# Patient Record
Sex: FEMALE | Race: WHITE | HISPANIC OR LATINO
[De-identification: names, ages, dates, MRNs, and addresses within clinical notes are randomized per-mention and may not be internally consistent; named-entity substitution may affect disease eponyms.]

---

## 2020-06-22 ENCOUNTER — TRANSCRIPTION ENCOUNTER (OUTPATIENT)
Age: 55
End: 2020-06-22

## 2024-02-28 ENCOUNTER — INPATIENT (INPATIENT)
Facility: HOSPITAL | Age: 59
LOS: 0 days | Discharge: HOME | DRG: 512 | End: 2024-02-29
Attending: ORTHOPAEDIC SURGERY | Admitting: ORTHOPAEDIC SURGERY
Payer: MEDICAID

## 2024-02-28 ENCOUNTER — TRANSCRIPTION ENCOUNTER (OUTPATIENT)
Age: 59
End: 2024-02-28

## 2024-02-28 VITALS
WEIGHT: 179.9 LBS | SYSTOLIC BLOOD PRESSURE: 131 MMHG | OXYGEN SATURATION: 98 % | RESPIRATION RATE: 20 BRPM | HEART RATE: 76 BPM | TEMPERATURE: 99 F | HEIGHT: 61 IN | DIASTOLIC BLOOD PRESSURE: 81 MMHG

## 2024-02-28 DIAGNOSIS — S52.90XA UNSPECIFIED FRACTURE OF UNSPECIFIED FOREARM, INITIAL ENCOUNTER FOR CLOSED FRACTURE: ICD-10-CM

## 2024-02-28 LAB
ALBUMIN SERPL ELPH-MCNC: 4.4 G/DL — SIGNIFICANT CHANGE UP (ref 3.3–5)
ALBUMIN SERPL ELPH-MCNC: 4.4 G/DL — SIGNIFICANT CHANGE UP (ref 3.3–5)
ALP SERPL-CCNC: 110 U/L — SIGNIFICANT CHANGE UP (ref 40–120)
ALT FLD-CCNC: 26 U/L — SIGNIFICANT CHANGE UP (ref 10–45)
ANION GAP SERPL CALC-SCNC: 12 MMOL/L — SIGNIFICANT CHANGE UP (ref 5–17)
ANION GAP SERPL CALC-SCNC: 12 MMOL/L — SIGNIFICANT CHANGE UP (ref 5–17)
APTT BLD: 34 SEC — SIGNIFICANT CHANGE UP (ref 24.5–35.6)
AST SERPL-CCNC: 32 U/L — SIGNIFICANT CHANGE UP (ref 10–40)
BASOPHILS # BLD AUTO: 0.1 K/UL — SIGNIFICANT CHANGE UP (ref 0–0.2)
BASOPHILS NFR BLD AUTO: 0.9 % — SIGNIFICANT CHANGE UP (ref 0–2)
BILIRUB SERPL-MCNC: 0.8 MG/DL — SIGNIFICANT CHANGE UP (ref 0.2–1.2)
BLD GP AB SCN SERPL QL: NEGATIVE — SIGNIFICANT CHANGE UP
BUN SERPL-MCNC: 10 MG/DL — SIGNIFICANT CHANGE UP (ref 7–23)
BUN SERPL-MCNC: 10 MG/DL — SIGNIFICANT CHANGE UP (ref 7–23)
CALCIUM SERPL-MCNC: 8.7 MG/DL — SIGNIFICANT CHANGE UP (ref 8.4–10.5)
CALCIUM SERPL-MCNC: 8.7 MG/DL — SIGNIFICANT CHANGE UP (ref 8.4–10.5)
CHLORIDE SERPL-SCNC: 105 MMOL/L — SIGNIFICANT CHANGE UP (ref 96–108)
CHLORIDE SERPL-SCNC: 105 MMOL/L — SIGNIFICANT CHANGE UP (ref 96–108)
CO2 SERPL-SCNC: 23 MMOL/L — SIGNIFICANT CHANGE UP (ref 22–31)
CO2 SERPL-SCNC: 23 MMOL/L — SIGNIFICANT CHANGE UP (ref 22–31)
CREAT SERPL-MCNC: 0.41 MG/DL — LOW (ref 0.5–1.3)
CREAT SERPL-MCNC: 0.41 MG/DL — LOW (ref 0.5–1.3)
EGFR: 113 ML/MIN/1.73M2 — SIGNIFICANT CHANGE UP
EGFR: 113 ML/MIN/1.73M2 — SIGNIFICANT CHANGE UP
EOSINOPHIL # BLD AUTO: 0.1 K/UL — SIGNIFICANT CHANGE UP (ref 0–0.5)
EOSINOPHIL NFR BLD AUTO: 0.9 % — SIGNIFICANT CHANGE UP (ref 0–6)
GLUCOSE SERPL-MCNC: 124 MG/DL — HIGH (ref 70–99)
GLUCOSE SERPL-MCNC: 124 MG/DL — HIGH (ref 70–99)
HCT VFR BLD CALC: 39.2 % — SIGNIFICANT CHANGE UP (ref 34.5–45)
HGB BLD-MCNC: 13.3 G/DL — SIGNIFICANT CHANGE UP (ref 11.5–15.5)
INR BLD: 1.09 RATIO — SIGNIFICANT CHANGE UP (ref 0.85–1.18)
LYMPHOCYTES # BLD AUTO: 1 K/UL — SIGNIFICANT CHANGE UP (ref 1–3.3)
LYMPHOCYTES # BLD AUTO: 9.5 % — LOW (ref 13–44)
MCHC RBC-ENTMCNC: 25.1 PG — LOW (ref 27–34)
MCHC RBC-ENTMCNC: 33.9 GM/DL — SIGNIFICANT CHANGE UP (ref 32–36)
MCV RBC AUTO: 74 FL — LOW (ref 80–100)
MONOCYTES # BLD AUTO: 0.37 K/UL — SIGNIFICANT CHANGE UP (ref 0–0.9)
MONOCYTES NFR BLD AUTO: 3.5 % — SIGNIFICANT CHANGE UP (ref 2–14)
NEUTROPHILS # BLD AUTO: 8.63 K/UL — HIGH (ref 1.8–7.4)
NEUTROPHILS NFR BLD AUTO: 79.1 % — HIGH (ref 43–77)
PLATELET # BLD AUTO: 221 K/UL — SIGNIFICANT CHANGE UP (ref 150–400)
POTASSIUM SERPL-MCNC: 3.9 MMOL/L — SIGNIFICANT CHANGE UP (ref 3.5–5.3)
POTASSIUM SERPL-MCNC: 3.9 MMOL/L — SIGNIFICANT CHANGE UP (ref 3.5–5.3)
POTASSIUM SERPL-SCNC: 3.9 MMOL/L — SIGNIFICANT CHANGE UP (ref 3.5–5.3)
POTASSIUM SERPL-SCNC: 3.9 MMOL/L — SIGNIFICANT CHANGE UP (ref 3.5–5.3)
PROT SERPL-MCNC: 7.3 G/DL — SIGNIFICANT CHANGE UP (ref 6–8.3)
PROTHROM AB SERPL-ACNC: 11.4 SEC — SIGNIFICANT CHANGE UP (ref 9.5–13)
RBC # BLD: 5.3 M/UL — HIGH (ref 3.8–5.2)
RBC # FLD: 14.5 % — SIGNIFICANT CHANGE UP (ref 10.3–14.5)
RH IG SCN BLD-IMP: POSITIVE — SIGNIFICANT CHANGE UP
SODIUM SERPL-SCNC: 140 MMOL/L — SIGNIFICANT CHANGE UP (ref 135–145)
SODIUM SERPL-SCNC: 140 MMOL/L — SIGNIFICANT CHANGE UP (ref 135–145)
WBC # BLD: 10.56 K/UL — HIGH (ref 3.8–10.5)
WBC # FLD AUTO: 10.56 K/UL — HIGH (ref 3.8–10.5)

## 2024-02-28 PROCEDURE — 73090 X-RAY EXAM OF FOREARM: CPT | Mod: 26,LT,77

## 2024-02-28 PROCEDURE — 99285 EMERGENCY DEPT VISIT HI MDM: CPT

## 2024-02-28 PROCEDURE — 73030 X-RAY EXAM OF SHOULDER: CPT | Mod: 26,RT

## 2024-02-28 PROCEDURE — 73090 X-RAY EXAM OF FOREARM: CPT | Mod: 26,LT

## 2024-02-28 PROCEDURE — 73130 X-RAY EXAM OF HAND: CPT | Mod: 26,RT

## 2024-02-28 PROCEDURE — 73110 X-RAY EXAM OF WRIST: CPT | Mod: 26,RT

## 2024-02-28 PROCEDURE — 73110 X-RAY EXAM OF WRIST: CPT | Mod: 26,RT,77

## 2024-02-28 PROCEDURE — 76377 3D RENDER W/INTRP POSTPROCES: CPT | Mod: 26

## 2024-02-28 PROCEDURE — 73080 X-RAY EXAM OF ELBOW: CPT | Mod: 26,RT

## 2024-02-28 PROCEDURE — 71045 X-RAY EXAM CHEST 1 VIEW: CPT | Mod: 26

## 2024-02-28 PROCEDURE — 73200 CT UPPER EXTREMITY W/O DYE: CPT | Mod: 26,RT,MC

## 2024-02-28 RX ORDER — OXYCODONE HYDROCHLORIDE 5 MG/1
5 TABLET ORAL ONCE
Refills: 0 | Status: DISCONTINUED | OUTPATIENT
Start: 2024-02-28 | End: 2024-02-28

## 2024-02-28 RX ORDER — OXYCODONE HYDROCHLORIDE 5 MG/1
2.5 TABLET ORAL EVERY 4 HOURS
Refills: 0 | Status: DISCONTINUED | OUTPATIENT
Start: 2024-02-28 | End: 2024-02-29

## 2024-02-28 RX ORDER — HEPARIN SODIUM 5000 [USP'U]/ML
5000 INJECTION INTRAVENOUS; SUBCUTANEOUS EVERY 12 HOURS
Refills: 0 | Status: DISCONTINUED | OUTPATIENT
Start: 2024-02-28 | End: 2024-02-28

## 2024-02-28 RX ORDER — SODIUM CHLORIDE 9 MG/ML
1000 INJECTION INTRAMUSCULAR; INTRAVENOUS; SUBCUTANEOUS
Refills: 0 | Status: DISCONTINUED | OUTPATIENT
Start: 2024-02-28 | End: 2024-02-29

## 2024-02-28 RX ORDER — ACETAMINOPHEN 500 MG
975 TABLET ORAL ONCE
Refills: 0 | Status: COMPLETED | OUTPATIENT
Start: 2024-02-28 | End: 2024-02-28

## 2024-02-28 RX ORDER — IBUPROFEN 200 MG
600 TABLET ORAL ONCE
Refills: 0 | Status: COMPLETED | OUTPATIENT
Start: 2024-02-28 | End: 2024-02-28

## 2024-02-28 RX ORDER — HEPARIN SODIUM 5000 [USP'U]/ML
5000 INJECTION INTRAVENOUS; SUBCUTANEOUS EVERY 12 HOURS
Refills: 0 | Status: COMPLETED | OUTPATIENT
Start: 2024-02-28 | End: 2024-02-28

## 2024-02-28 RX ORDER — OXYCODONE HYDROCHLORIDE 5 MG/1
5 TABLET ORAL EVERY 4 HOURS
Refills: 0 | Status: DISCONTINUED | OUTPATIENT
Start: 2024-02-28 | End: 2024-02-29

## 2024-02-28 RX ORDER — ACETAMINOPHEN 500 MG
975 TABLET ORAL EVERY 8 HOURS
Refills: 0 | Status: DISCONTINUED | OUTPATIENT
Start: 2024-02-28 | End: 2024-02-29

## 2024-02-28 RX ORDER — METOCLOPRAMIDE HCL 10 MG
5 TABLET ORAL EVERY 6 HOURS
Refills: 0 | Status: DISCONTINUED | OUTPATIENT
Start: 2024-02-28 | End: 2024-02-29

## 2024-02-28 RX ADMIN — Medication 975 MILLIGRAM(S): at 21:05

## 2024-02-28 RX ADMIN — Medication 975 MILLIGRAM(S): at 23:36

## 2024-02-28 RX ADMIN — Medication 600 MILLIGRAM(S): at 14:08

## 2024-02-28 RX ADMIN — Medication 975 MILLIGRAM(S): at 14:08

## 2024-02-28 RX ADMIN — OXYCODONE HYDROCHLORIDE 5 MILLIGRAM(S): 5 TABLET ORAL at 15:13

## 2024-02-28 RX ADMIN — HEPARIN SODIUM 5000 UNIT(S): 5000 INJECTION INTRAVENOUS; SUBCUTANEOUS at 20:53

## 2024-02-28 NOTE — ED ADULT NURSE REASSESSMENT NOTE - NS ED NURSE REASSESS COMMENT FT1
No complaints from patient at this time. Pt has brisk cap refill +2 in splinted right extremity, denies numbness / tingling. No complaints at this time

## 2024-02-28 NOTE — ED PROVIDER NOTE - OBJECTIVE STATEMENT
59yo F with PMH of preDM, HTN, R hand dominant presenting with R hand and wrist pain s/p FOOSH 2 hours ago. Pt is both English and Cook Islander speaking, prefers son-in-law at bedside to assist with [place]er interpretation when needed. Pt reports she tripped and fell onto RUE earlier today, no head injury, no LOC. Reports pain and swelling to L wrist and hand. Reports mild numbness to R index finger. Denies any R elbow/upper arm/shoulder pain, any other numbness/tingling, neck pain, back pain. Has not taken anything for pain. 59yo F with PMH of preDM, HTN, R hand dominant, presenting with R hand and wrist pain s/p FOOSH 2 hours ago. Pt is both English and Bengali speaking, prefers son-in-law at bedside to assist with Bengali interpretation when needed. Pt reports she tripped and fell onto RUE earlier today, no head injury, no LOC. Reports pain and swelling to L wrist and hand. Reports mild numbness to R index finger. Denies any R elbow/upper arm/shoulder pain, any other numbness/tingling, neck pain, back pain. Has not taken anything for pain. 57yo F with PMH of preDM, HTN, R hand dominant, presenting with R hand and wrist pain s/p FOOSH 2 hours ago. Pt is both English and Tajik speaking, prefers son-in-law at bedside to assist with Tajik interpretation when needed. Pt reports she tripped and fell onto RUE earlier today, no head injury, no LOC. Reports pain and swelling to R wrist and hand. Reports mild numbness to R index finger. Denies any R elbow/upper arm/shoulder pain, any other numbness/tingling, neck pain, back pain. Has not taken anything for pain.

## 2024-02-28 NOTE — ED PROVIDER NOTE - NSFOLLOWUPINSTRUCTIONS_ED_ALL_ED_FT
Keep splint in place as instructed.   Remain non-weight bearing on your right arm.   Keep your Right arm elevated when possible to reduce swelling.   Apply ice 20mins on, 40mins off in cycle for first 24-48 hours.    Take Ibuprofen (i.e. Motrin, Advil) 600mg every 8 hrs for pain as needed. Take with food.   May alternate with Acetaminophen (Tylenol) 650mg every 6 hours for pain as needed.     Take oxycodone 5mg every 8 hours as needed for severe pain *** caution SIDE EFFECT of drowsiness - DO NOT drive or drink alcohol while taking this medication.     Follow up with Orthopedic,  ____ in office this week. Information provided.    Return to ED if symptoms worsen, for worsening pain, fever, weakness, numbness, or any other concerning symptoms.

## 2024-02-28 NOTE — ED PROVIDER NOTE - CLINICAL SUMMARY MEDICAL DECISION MAKING FREE TEXT BOX
57 yo F PMHx of pre-DM, HTN, presenting following FOOSH injury w/ c/o R wrist pain/injury. Son at bedside for translation. Injury isolated to wrist, no head strike or LOC. Mild numbness 3rd digit distally, otherwise no numbness distal to injury. Exam w/ deformity and sig swelling of R wrist, unable to rom, sensation intact distally, cap refill and radial pulse intact. Able to wiggle all fingers of hand. Exam otherwise atraumatic. Head atraumatic. CTAB. Heart RRR. Plan for XRs, symptomatic tx. High likelihood of fx based on exam. Initial exam not consistent w/ compartment syndrome. Will reassess after imaging.

## 2024-02-28 NOTE — ED PROVIDER NOTE - UPPER EXTREMITY EXAM, RIGHT
+ diffuse swelling and ttp to R wrist, radial pulse 2+, sensation intact to RUE, cap refill <2s; no ttp to prox forearm/elbow/humerus/shoulder

## 2024-02-28 NOTE — H&P ADULT - HISTORY OF PRESENT ILLNESS
58y Female RHD presents to ED after mechanical fall with severe right wrist pain. Patient denies headstrike or LOC. Patient noticed immediate pain and inability to range the wrist. Patient subsequently came to the ED for further evaluation and management. In the ED, endorses pain and swelling over the wrist and pain with range of motion. Patient denies any fevers, chills, numbness, tingling, weakness, or any other orthopaedic complaint.     Physical Exam  Vital Signs Last 24 Hrs  T(C): 37 (02-28-24 @ 17:20), Max: 37.1 (02-28-24 @ 13:00)  T(F): 98.6 (02-28-24 @ 17:20), Max: 98.8 (02-28-24 @ 13:00)  HR: 58 (02-28-24 @ 17:20) (58 - 76)  BP: 128/61 (02-28-24 @ 17:20) (128/61 - 131/81)  BP(mean): --  RR: 17 (02-28-24 @ 17:20) (17 - 20)  SpO2: 99% (02-28-24 @ 17:20) (98% - 99%)    Gen: Resting in bed, NAD  RUE:   Skin intact. Notable edema and deformity over the wrist/distal forearm. No lesions appreciated.   TTP over deformity, decreased and painful ROM of the wrist; otherwise, NTTP throughout the rest of the extremity.  SILT C5-T1.    Ax/rad/msc/med/uln/ain/pin intact.   Radial pulse palpable.   No calf tenderness bilaterally.   Compartments soft and compressible.     Secondary Assessment:  NC/AT, NTTP of clavicles, NTTP of C-spine,T-spine, or L-spine in the midline and paraspinal areas; NTTP of pelvis  LUE: NTTP of Shoulder, Elbow, Wrist, Hand; NT with AROM/PROM of Shoulder, Elbow, Wrist, Hand; AIN/PIN/Med/Uln/Msc/Rad/Ax intact  LLE: Able to SLR, NT with Log Roll, NT with Heel Strike, NTTP of Hip, Knee, Ankle, Foot; NT with AROM/PROM of Hip, Knee, Ankle, Foot; Q/H/GSC/TA/EHL/FHL intact  RLE: Able to SLR, NT with Log Roll, NT with Heel Strike, NTTP of Hip, Knee, Ankle, Foot; NT with AROM/PROM of Hip, Knee, Ankle, Foot; Q/H/GSC/TA/EHL/FHL intact      Imaging: Xray, 3 views of R wrist reviewed demonstrating distal radius fracture    Note:  Risks and benefits for the procedure were explained to the patient. An injection was offered to the patient for analgesia prior to procedure. The patient expressed understanding and agreed with the plan. The patient gave verbal consent for the injection and procedure. The skin was prepped in sterile fashion with alcohol scrub. The fracture site was then injected with 10mL 1% lidocaine without epinephrine. Time was allowed for anesthetic effect to occur. Once the patient was comfortable, the extremity was generally cleaned. The fracture was then manipulated/closed reduced. The extremity was wrapped with Webril, with care to pad the bony prominences over the olecranon and medial and lateral epicondyles. A plaster splint was applied; wrapped with Webril and an ace wrap; and molded until hardened. Care was taken to avoid sharp edges and to protect the skin. The extremity was elevated on pillows and ice was applied. Neurovascular exam was unchanged after the procedure. Post reduction films were ordered and demonstrated adequate reduction of the fracture.    Assessment and Plan  58y Female with right distal radius fracture    Imaging findings reviewed and discussed with the patient.   Fracture reduced and splinted per above procedure note.   JOY PATTEN in sugar tong splint  Pain control PRN  Dispo: Admit to Dr. Gong for OR tomorrow  All of the patient's questions and concerns were answered and addressed.  Will discuss with Dr. Gong and advise of any changes to the plan.

## 2024-02-28 NOTE — ED ADULT NURSE NOTE - OBJECTIVE STATEMENT
59 y/o Female presents to ED from home with c/o right wrist pain. Son in law at bedside. PMH of DM, HTN. Pt states she had single mechanical trip and fall onto right wrist. Endorses right wrist pain. Denies SOB, CP, HA, N/V/D, numbness/tingling. Has not taken anything for pain. Pt is A&Ox3, speaking full sentences without difficulty. Airway patent with spontaneous unlabored breathing,  skin is warm and normal color for race. Equal sensation, pt is able to move wrist and fingers. Safety maintained bed in lowest position and locked.

## 2024-02-28 NOTE — ED ADULT NURSE NOTE - NSFALLRISKASMT_ED_ALL_ED_DT
TCM call completed.  Patient says he is feeling much better.  No nausea or vomiting and pt says he has returned to his normal activities although he does tire quickly.  Pt says he was able to get all prescribed medications and is taking those as directed.  Encouraged pt to call MD with any worsening symptoms and to see ER care for emergent issues.  Pt says he will need a follow up appt scheduled and he would like to see ANGELIA Colon since she saw him previously.  No questions or concerns at this time.   28-Feb-2024 14:44

## 2024-02-28 NOTE — ED PROVIDER NOTE - PROGRESS NOTE DETAILS
Spoke with ortho resident, reports pt is pending CT wrist, and final plan pending results of CT. - Caitlin Herman PA-C Ortho recommending admission under Dr. Gong, plan for OR tomorrow. Pt agreeable with plan. Rajiv Huddleston PA-C

## 2024-02-29 ENCOUNTER — TRANSCRIPTION ENCOUNTER (OUTPATIENT)
Age: 59
End: 2024-02-29

## 2024-02-29 DIAGNOSIS — E11.9 TYPE 2 DIABETES MELLITUS WITHOUT COMPLICATIONS: ICD-10-CM

## 2024-02-29 DIAGNOSIS — R52 PAIN, UNSPECIFIED: ICD-10-CM

## 2024-02-29 DIAGNOSIS — I10 ESSENTIAL (PRIMARY) HYPERTENSION: ICD-10-CM

## 2024-02-29 DIAGNOSIS — S52.501A UNSPECIFIED FRACTURE OF THE LOWER END OF RIGHT RADIUS, INITIAL ENCOUNTER FOR CLOSED FRACTURE: ICD-10-CM

## 2024-02-29 LAB
ANION GAP SERPL CALC-SCNC: 11 MMOL/L — SIGNIFICANT CHANGE UP (ref 5–17)
ANION GAP SERPL CALC-SCNC: 11 MMOL/L — SIGNIFICANT CHANGE UP (ref 5–17)
APTT BLD: 35.4 SEC — SIGNIFICANT CHANGE UP (ref 24.5–35.6)
BLD GP AB SCN SERPL QL: NEGATIVE — SIGNIFICANT CHANGE UP
BUN SERPL-MCNC: 6 MG/DL — LOW (ref 7–23)
BUN SERPL-MCNC: 9 MG/DL — SIGNIFICANT CHANGE UP (ref 7–23)
CALCIUM SERPL-MCNC: 8.1 MG/DL — LOW (ref 8.4–10.5)
CALCIUM SERPL-MCNC: 8.7 MG/DL — SIGNIFICANT CHANGE UP (ref 8.4–10.5)
CHLORIDE SERPL-SCNC: 106 MMOL/L — SIGNIFICANT CHANGE UP (ref 96–108)
CHLORIDE SERPL-SCNC: 108 MMOL/L — SIGNIFICANT CHANGE UP (ref 96–108)
CO2 SERPL-SCNC: 22 MMOL/L — SIGNIFICANT CHANGE UP (ref 22–31)
CO2 SERPL-SCNC: 27 MMOL/L — SIGNIFICANT CHANGE UP (ref 22–31)
CREAT SERPL-MCNC: 0.49 MG/DL — LOW (ref 0.5–1.3)
CREAT SERPL-MCNC: 0.5 MG/DL — SIGNIFICANT CHANGE UP (ref 0.5–1.3)
EGFR: 107 ML/MIN/1.73M2 — SIGNIFICANT CHANGE UP
EGFR: 108 ML/MIN/1.73M2 — SIGNIFICANT CHANGE UP
GLUCOSE SERPL-MCNC: 124 MG/DL — HIGH (ref 70–99)
GLUCOSE SERPL-MCNC: 135 MG/DL — HIGH (ref 70–99)
HCG SERPL-ACNC: <2 MIU/ML — SIGNIFICANT CHANGE UP
HCG UR QL: NEGATIVE — SIGNIFICANT CHANGE UP
HCT VFR BLD CALC: 38.6 % — SIGNIFICANT CHANGE UP (ref 34.5–45)
HCT VFR BLD CALC: 39.6 % — SIGNIFICANT CHANGE UP (ref 34.5–45)
HGB BLD-MCNC: 12.8 G/DL — SIGNIFICANT CHANGE UP (ref 11.5–15.5)
HGB BLD-MCNC: 13.3 G/DL — SIGNIFICANT CHANGE UP (ref 11.5–15.5)
INR BLD: 1.02 RATIO — SIGNIFICANT CHANGE UP (ref 0.85–1.18)
MCHC RBC-ENTMCNC: 25.1 PG — LOW (ref 27–34)
MCHC RBC-ENTMCNC: 25.2 PG — LOW (ref 27–34)
MCHC RBC-ENTMCNC: 33.2 GM/DL — SIGNIFICANT CHANGE UP (ref 32–36)
MCHC RBC-ENTMCNC: 33.6 GM/DL — SIGNIFICANT CHANGE UP (ref 32–36)
MCV RBC AUTO: 75 FL — LOW (ref 80–100)
MCV RBC AUTO: 75.8 FL — LOW (ref 80–100)
NRBC # BLD: 0 /100 WBCS — SIGNIFICANT CHANGE UP (ref 0–0)
NRBC # BLD: 0 /100 WBCS — SIGNIFICANT CHANGE UP (ref 0–0)
PLATELET # BLD AUTO: 200 K/UL — SIGNIFICANT CHANGE UP (ref 150–400)
PLATELET # BLD AUTO: 211 K/UL — SIGNIFICANT CHANGE UP (ref 150–400)
POTASSIUM SERPL-MCNC: 3.2 MMOL/L — LOW (ref 3.5–5.3)
POTASSIUM SERPL-MCNC: 3.5 MMOL/L — SIGNIFICANT CHANGE UP (ref 3.5–5.3)
POTASSIUM SERPL-SCNC: 3.2 MMOL/L — LOW (ref 3.5–5.3)
POTASSIUM SERPL-SCNC: 3.5 MMOL/L — SIGNIFICANT CHANGE UP (ref 3.5–5.3)
PROTHROM AB SERPL-ACNC: 10.7 SEC — SIGNIFICANT CHANGE UP (ref 9.5–13)
RBC # BLD: 5.09 M/UL — SIGNIFICANT CHANGE UP (ref 3.8–5.2)
RBC # BLD: 5.28 M/UL — HIGH (ref 3.8–5.2)
RBC # FLD: 14.6 % — HIGH (ref 10.3–14.5)
RBC # FLD: 14.6 % — HIGH (ref 10.3–14.5)
RH IG SCN BLD-IMP: POSITIVE — SIGNIFICANT CHANGE UP
SODIUM SERPL-SCNC: 141 MMOL/L — SIGNIFICANT CHANGE UP (ref 135–145)
SODIUM SERPL-SCNC: 144 MMOL/L — SIGNIFICANT CHANGE UP (ref 135–145)
WBC # BLD: 7.58 K/UL — SIGNIFICANT CHANGE UP (ref 3.8–10.5)
WBC # BLD: 8.08 K/UL — SIGNIFICANT CHANGE UP (ref 3.8–10.5)
WBC # FLD AUTO: 7.58 K/UL — SIGNIFICANT CHANGE UP (ref 3.8–10.5)
WBC # FLD AUTO: 8.08 K/UL — SIGNIFICANT CHANGE UP (ref 3.8–10.5)

## 2024-02-29 PROCEDURE — 73110 X-RAY EXAM OF WRIST: CPT

## 2024-02-29 PROCEDURE — 25609 OPTX DST RD XART FX/EP SEP3+: CPT | Mod: RT

## 2024-02-29 PROCEDURE — 85027 COMPLETE CBC AUTOMATED: CPT

## 2024-02-29 PROCEDURE — 85610 PROTHROMBIN TIME: CPT

## 2024-02-29 PROCEDURE — 71045 X-RAY EXAM CHEST 1 VIEW: CPT

## 2024-02-29 PROCEDURE — 76000 FLUOROSCOPY <1 HR PHYS/QHP: CPT

## 2024-02-29 PROCEDURE — 81025 URINE PREGNANCY TEST: CPT

## 2024-02-29 PROCEDURE — 84702 CHORIONIC GONADOTROPIN TEST: CPT

## 2024-02-29 PROCEDURE — 85025 COMPLETE CBC W/AUTO DIFF WBC: CPT

## 2024-02-29 PROCEDURE — 82040 ASSAY OF SERUM ALBUMIN: CPT

## 2024-02-29 PROCEDURE — 73130 X-RAY EXAM OF HAND: CPT

## 2024-02-29 PROCEDURE — C1713: CPT

## 2024-02-29 PROCEDURE — 80053 COMPREHEN METABOLIC PANEL: CPT

## 2024-02-29 PROCEDURE — 73080 X-RAY EXAM OF ELBOW: CPT

## 2024-02-29 PROCEDURE — 86901 BLOOD TYPING SEROLOGIC RH(D): CPT

## 2024-02-29 PROCEDURE — 86900 BLOOD TYPING SEROLOGIC ABO: CPT

## 2024-02-29 PROCEDURE — 99285 EMERGENCY DEPT VISIT HI MDM: CPT | Mod: 25

## 2024-02-29 PROCEDURE — 80048 BASIC METABOLIC PNL TOTAL CA: CPT

## 2024-02-29 PROCEDURE — 73090 X-RAY EXAM OF FOREARM: CPT

## 2024-02-29 PROCEDURE — 73200 CT UPPER EXTREMITY W/O DYE: CPT | Mod: MC

## 2024-02-29 PROCEDURE — 85730 THROMBOPLASTIN TIME PARTIAL: CPT

## 2024-02-29 PROCEDURE — 76377 3D RENDER W/INTRP POSTPROCES: CPT

## 2024-02-29 PROCEDURE — 73030 X-RAY EXAM OF SHOULDER: CPT

## 2024-02-29 PROCEDURE — 86850 RBC ANTIBODY SCREEN: CPT

## 2024-02-29 DEVICE — SCREW TRILOCK 2.5X20MM: Type: IMPLANTABLE DEVICE | Site: RIGHT | Status: FUNCTIONAL

## 2024-02-29 DEVICE — SCREW TRILOCK 2.5X12MM: Type: IMPLANTABLE DEVICE | Site: RIGHT | Status: FUNCTIONAL

## 2024-02-29 DEVICE — SCREW CORT 2.5X12MM: Type: IMPLANTABLE DEVICE | Site: RIGHT | Status: FUNCTIONAL

## 2024-02-29 DEVICE — SCREW TRILOCK 2.5X14MM: Type: IMPLANTABLE DEVICE | Site: RIGHT | Status: FUNCTIONAL

## 2024-02-29 DEVICE — K-WIRE MEDARTIS (SMOOTH) SINGLE TROCAR 1.6MM X 150MM: Type: IMPLANTABLE DEVICE | Site: RIGHT | Status: FUNCTIONAL

## 2024-02-29 DEVICE — IMPLANTABLE DEVICE: Type: IMPLANTABLE DEVICE | Site: RIGHT | Status: FUNCTIONAL

## 2024-02-29 RX ORDER — POLYETHYLENE GLYCOL 3350 17 G/17G
17 POWDER, FOR SOLUTION ORAL DAILY
Refills: 0 | Status: DISCONTINUED | OUTPATIENT
Start: 2024-02-29 | End: 2024-02-29

## 2024-02-29 RX ORDER — SENNA PLUS 8.6 MG/1
2 TABLET ORAL
Qty: 14 | Refills: 0
Start: 2024-02-29 | End: 2024-03-06

## 2024-02-29 RX ORDER — SODIUM CHLORIDE 9 MG/ML
1000 INJECTION INTRAMUSCULAR; INTRAVENOUS; SUBCUTANEOUS
Refills: 0 | Status: DISCONTINUED | OUTPATIENT
Start: 2024-02-29 | End: 2024-02-29

## 2024-02-29 RX ORDER — ONDANSETRON 8 MG/1
4 TABLET, FILM COATED ORAL ONCE
Refills: 0 | Status: DISCONTINUED | OUTPATIENT
Start: 2024-02-29 | End: 2024-02-29

## 2024-02-29 RX ORDER — POTASSIUM CHLORIDE 20 MEQ
40 PACKET (EA) ORAL ONCE
Refills: 0 | Status: COMPLETED | OUTPATIENT
Start: 2024-02-29 | End: 2024-02-29

## 2024-02-29 RX ORDER — OXYCODONE HYDROCHLORIDE 5 MG/1
10 TABLET ORAL EVERY 4 HOURS
Refills: 0 | Status: DISCONTINUED | OUTPATIENT
Start: 2024-02-29 | End: 2024-02-29

## 2024-02-29 RX ORDER — CEFAZOLIN SODIUM 1 G
2000 VIAL (EA) INJECTION EVERY 8 HOURS
Refills: 0 | Status: COMPLETED | OUTPATIENT
Start: 2024-02-29 | End: 2024-03-01

## 2024-02-29 RX ORDER — SENNA PLUS 8.6 MG/1
2 TABLET ORAL AT BEDTIME
Refills: 0 | Status: DISCONTINUED | OUTPATIENT
Start: 2024-02-29 | End: 2024-02-29

## 2024-02-29 RX ORDER — OXYCODONE HYDROCHLORIDE 5 MG/1
1 TABLET ORAL
Qty: 42 | Refills: 0
Start: 2024-02-29 | End: 2024-03-06

## 2024-02-29 RX ORDER — HYDROMORPHONE HYDROCHLORIDE 2 MG/ML
0.5 INJECTION INTRAMUSCULAR; INTRAVENOUS; SUBCUTANEOUS
Refills: 0 | Status: DISCONTINUED | OUTPATIENT
Start: 2024-02-29 | End: 2024-02-29

## 2024-02-29 RX ORDER — OXYCODONE HYDROCHLORIDE 5 MG/1
5 TABLET ORAL EVERY 4 HOURS
Refills: 0 | Status: DISCONTINUED | OUTPATIENT
Start: 2024-02-29 | End: 2024-02-29

## 2024-02-29 RX ORDER — ONDANSETRON 8 MG/1
4 TABLET, FILM COATED ORAL EVERY 6 HOURS
Refills: 0 | Status: DISCONTINUED | OUTPATIENT
Start: 2024-02-29 | End: 2024-02-29

## 2024-02-29 RX ORDER — ACETAMINOPHEN 500 MG
975 TABLET ORAL EVERY 8 HOURS
Refills: 0 | Status: DISCONTINUED | OUTPATIENT
Start: 2024-02-29 | End: 2024-02-29

## 2024-02-29 RX ORDER — ACETAMINOPHEN 500 MG
3 TABLET ORAL
Qty: 63 | Refills: 0
Start: 2024-02-29 | End: 2024-03-06

## 2024-02-29 RX ADMIN — Medication 40 MILLIEQUIVALENT(S): at 05:58

## 2024-02-29 RX ADMIN — Medication 975 MILLIGRAM(S): at 05:58

## 2024-02-29 NOTE — CONSULT NOTE ADULT - PROBLEM SELECTOR RECOMMENDATION 9
Patient scheduled for an Open reduction and internal fixation of the right distal radius  No contraindication to scheduled procedure  Patient is NPO  DVT and GI prophylaxis as per ortho

## 2024-02-29 NOTE — CONSULT NOTE ADULT - ASSESSMENT
57 Yo woman s/p a fall at home presents with a right distal radius fracture. Patient is scheduled for an Open reduction and internal fixation of the right wrist.

## 2024-02-29 NOTE — DISCHARGE NOTE NURSING/CASE MANAGEMENT/SOCIAL WORK - NSDCFUADDAPPT_GEN_ALL_CORE_FT
Please follow up with Dr. Gong in 2 weeks from surgery date.  Please call office to schedule and confirm your appointment date and time.    Please Follow up with your primary care provider in approximately one month from hospital discharge to discuss your recent surgery/admission and for continuum of care.

## 2024-02-29 NOTE — PATIENT PROFILE ADULT - FALL HARM RISK - RISK INTERVENTIONS
Assistance OOB with selected safe patient handling equipment/Assistance with ambulation/Communicate Fall Risk and Risk Factors to all staff, patient, and family/Discuss with provider need for PT consult/Monitor gait and stability/Reinforce activity limits and safety measures with patient and family/Visual Cue: Yellow wristband/Bed in lowest position, wheels locked, appropriate side rails in place/Call bell, personal items and telephone in reach/Instruct patient to call for assistance before getting out of bed or chair/Non-slip footwear when patient is out of bed/[place] to call system/Physically safe environment - no spills, clutter or unnecessary equipment/Purposeful Proactive Rounding/Room/bathroom lighting operational, light cord in reach

## 2024-02-29 NOTE — PRE-OP CHECKLIST - NS PREOP CHK MONITOR ANESTHESIA CONSENT
[facility]  Detox  [address] [place] [address]  Phone: [phone]  Fax: [phone]    [facility]  Detox  [address] [place] [address]  Phone: [phone]  Fax: [phone]     done

## 2024-02-29 NOTE — DISCHARGE NOTE PROVIDER - NSDCFUADDINST_GEN_ALL_CORE_FT
Please keep dressing clean and intact, Incision/dressing care to be performed in office follow up visit.  Non weight bearing right upper extremity.  Range of motion as tolerated.

## 2024-02-29 NOTE — CONSULT NOTE ADULT - SUBJECTIVE AND OBJECTIVE BOX
59yo F with PMH of preDM, HTN, R hand dominant, presenting with R hand and wrist pain s/p FOOSH. Pt is both English and Lithuanian speaking, prefers son at bedside to assist with Lithuanian interpretation when needed. Pt reports she tripped and fell onto RUE, no head injury, no LOC. Reports pain and swelling to R wrist and hand. Reports mild numbness to R index finger. Denies any R elbow/upper arm/shoulder pain, any other numbness/tingling, neck pain, back pain. Patient was brought to [facility] for further evaluation a dn treatment. In the ED she was found to have a right distal radius frcature. pt is scheduled for an Open reduction and internal fixation of the right wrist with Dr Gong. Seen now resting comfortably      PAST MEDICAL & SURGICAL HISTORY:  HTN (hypertension)    diet controlled DM            MEDICATIONS  (STANDING):  acetaminophen     Tablet .. 975 milliGRAM(s) Oral every 8 hours  sodium chloride 0.9%. 1000 milliLiter(s) (125 mL/Hr) IV Continuous <Continuous>    MEDICATIONS  (PRN):  metoclopramide Injectable 5 milliGRAM(s) IV Push every 6 hours PRN Nausea and/or Vomiting  oxyCODONE    IR 5 milliGRAM(s) Oral every 4 hours PRN Severe Pain (7 - 10)  oxyCODONE    IR 2.5 milliGRAM(s) Oral every 4 hours PRN Moderate Pain (4 - 6)      ROS  CONSTITUTIONAL: No weakness, fevers or chills  EYES/ENT: No visual changes;  No vertigo or throat pain   NECK: No pain or stiffness  RESPIRATORY: No cough, wheezing, hemoptysis; No shortness of breath  CARDIOVASCULAR: No chest pain or palpitations  GASTROINTESTINAL: No abdominal or epigastric pain. No nausea, vomiting, or hematemesis; No diarrhea or constipation. No melena or hematochezia.  GENITOURINARY: No dysuria, frequency or hematuria  NEUROLOGICAL: No numbness or weakness  SKIN: No itching, burning, rashes, or lesions   MUSCULOSKELETAL: Right arm pain          INTERVAL HPI/OVERNIGHT EVENTS:  T(C): 36.6 (02-29-24 @ 07:42), Max: 37.1 (02-28-24 @ 13:00)  HR: 60 (02-29-24 @ 07:42) (57 - 76)  BP: 135/79 (02-29-24 @ 07:42) (118/74 - 148/77)  RR: 18 (02-29-24 @ 07:42) (17 - 20)  SpO2: 97% (02-29-24 @ 07:42) (96% - 99%)  Wt(kg): --  I&O's Summary      PHYSICAL EXAM:  GENERAL: NAD, well-groomed, well-developed  HEAD:  Atraumatic, Normocephalic  EYES: EOMI, PERRLA, conjunctiva and sclera clear  ENMT: No tonsillar erythema, exudates, or enlargement; Moist mucous membranes, Good dentition, No lesions  NECK: Supple, No JVD, Normal thyroid  NERVOUS SYSTEM:  Alert & Oriented X3, Good concentration; Motor Strength 5/5 B/L upper and lower extremities; DTRs 2+ intact and symmetric  CHEST/LUNG: Clear to percussion bilaterally; No rales, rhonchi, wheezing, or rubs  HEART: Regular rate and rhythm; No murmurs, rubs, or gallops  ABDOMEN: Soft, Nontender, Nondistended; Bowel sounds present  EXTREMITIES:  2+ Peripheral Pulses, No clubbing, cyanosis, or edema  LYMPH: No lymphadenopathy noted  SKIN: No rashes or lesions        LABS:                        13.3   7.58  )-----------( 211      ( 29 Feb 2024 03:38 )             39.6     02-29    144  |  106  |  9   ----------------------------<  124<H>  3.2<L>   |  27  |  0.50    Ca    8.7      29 Feb 2024 03:38    TPro  7.3  /  Alb  4.4  /  TBili  0.8  /  DBili  x   /  AST  32  /  ALT  26  /  AlkPhos  110  02-28    PT/INR - ( 29 Feb 2024 03:38 )   PT: 10.7 sec;   INR: 1.02 ratio         PTT - ( 29 Feb 2024 03:38 )  PTT:35.4 sec  Urinalysis Basic - ( 29 Feb 2024 03:38 )        EKG: Pending

## 2024-02-29 NOTE — PRE-OP CHECKLIST - ORDERS/MEDICATION ADMINISTRATION RECORD ON CHART
Hide Additional Notes?: No Detail Level: Detailed Include Location In Plan?: Yes Detail Level: Zone done

## 2024-02-29 NOTE — DISCHARGE NOTE PROVIDER - NSDCCPTREATMENT_GEN_ALL_CORE_FT
PRINCIPAL PROCEDURE  Procedure: Open reduction and internal fixation of 3 or more fragments of distal radius  Findings and Treatment:

## 2024-02-29 NOTE — PROGRESS NOTE ADULT - SUBJECTIVE AND OBJECTIVE BOX
Patient seen and examined at bedside. Reports no acute complaints at this time. Pain is well controlled. No acute events overnight.    ICU Vital Signs Last 24 Hrs  T(C): 36.7 (29 Feb 2024 02:40), Max: 37.1 (28 Feb 2024 13:00)  T(F): 98 (29 Feb 2024 02:40), Max: 98.8 (28 Feb 2024 13:00)  HR: 57 (29 Feb 2024 02:40) (57 - 76)  BP: 148/77 (29 Feb 2024 02:40) (118/74 - 148/77)  BP(mean): --  ABP: --  ABP(mean): --  RR: 18 (29 Feb 2024 02:40) (17 - 20)  SpO2: 98% (29 Feb 2024 02:40) (96% - 99%)    O2 Parameters below as of 29 Feb 2024 02:40  Patient On (Oxygen Delivery Method): room air                              13.3   7.58  )-----------( 211      ( 29 Feb 2024 03:38 )             39.6   02-29    144  |  106  |  9   ----------------------------<  124<H>  3.2<L>   |  27  |  0.50    Ca    8.7      29 Feb 2024 03:38    TPro  7.3  /  Alb  4.4  /  TBili  0.8  /  DBili  x   /  AST  32  /  ALT  26  /  AlkPhos  110  02-28      PHYSICAL EXAM:    Gen: NAD, AAOx3    Right Upper Extremity:  Splint clean dry intact  +AIN/PIN/M/R/U/Msc/Ax  SILT C5-T1  +Radial Pulse  Compartments soft        58F with R distal radius fracture pending OR today  -Needs pregnancy screen  -NPO/IVF  -NWB in sugar tong splint   -PT/OT  -Dispo pending

## 2024-02-29 NOTE — DISCHARGE NOTE PROVIDER - HOSPITAL COURSE
History of Present Illness:   58y Female RHD presents to ED after mechanical fall with severe right wrist pain. Patient denies headstrike or LOC. Patient noticed immediate pain and inability to range the wrist. Patient subsequently came to the ED for further evaluation and management. In the ED, endorses pain and swelling over the wrist and pain with range of motion. Patient denies any fevers, chills, numbness, tingling, weakness, or any other orthopaedic complain.    Hospital Course:  Patient admitted to [facility] on 2/28/24 for a R Distal Radius Fx.  Patient scheduled for a ORIF with Dr. Gong on 2/29/24.  Patient tolerated procedure well with no complications.  Physical Therapy and Occupational Therapy evaluated patient and cleared patient for safe home discharge with outpatient physical therapy recommendations. Remainder of hospital stay unremarkable and patient medically cleared and stable for discharge.

## 2024-02-29 NOTE — DISCHARGE NOTE PROVIDER - NSDCMRMEDTOKEN_GEN_ALL_CORE_FT
acetaminophen 325 mg oral tablet: 3 tab(s) orally every 8 hours as needed for MIld pain  oxyCODONE 5 mg oral tablet: 1 tab(s) orally every 4 hours as needed for Moderate to Severe pain MDD: 006  senna leaf extract oral tablet: 2 tab(s) orally once a day (at bedtime) as needed for  constipation

## 2024-02-29 NOTE — CONSULT NOTE ADULT - PROBLEM SELECTOR RECOMMENDATION 3
monitor glucose on BMP  If elevated would start finger sticks and an insulin sliding scale  consistent carbohydrate diet

## 2024-02-29 NOTE — DISCHARGE NOTE PROVIDER - NSDCCPCAREPLAN_GEN_ALL_CORE_FT
PRINCIPAL DISCHARGE DIAGNOSIS  Diagnosis: Radial fracture  Assessment and Plan of Treatment:       SECONDARY DISCHARGE DIAGNOSES  Diagnosis: Ulnar fracture  Assessment and Plan of Treatment:

## 2024-02-29 NOTE — DISCHARGE NOTE PROVIDER - NSDCACTIVITY_GEN_ALL_CORE
Okay to shower, please keep right wrist dressing dry.  Apply upper extremity sealed cover./Do not drive or operate machinery/Do not make important decisions/Stairs allowed/Walking - Indoors allowed/No heavy lifting/straining/Walking - Outdoors allowed

## 2024-02-29 NOTE — PRE-ANESTHESIA EVALUATION ADULT - NSANTHOSAYNRD_GEN_A_CORE
No. NIHARIKA screening performed.  STOP BANG Legend: 0-2 = LOW Risk; 3-4 = INTERMEDIATE Risk; 5-8 = HIGH Risk
[address] Detailed

## 2024-02-29 NOTE — BRIEF OPERATIVE NOTE - NSICDXBRIEFPROCEDURE_GEN_ALL_CORE_FT
PROCEDURES:  Open reduction and internal fixation of 3 or more fragments of distal radius 29-Feb-2024 13:29:49  Ileana Lopes

## 2024-02-29 NOTE — CHART NOTE - NSCHARTNOTEFT_GEN_A_CORE
Resting without complaints.  No Chest Pain, SOB, N/V.    T(C): 36.5 (02-29-24 @ 13:18), Max: 37 (02-28-24 @ 17:20)  HR: 62 (02-29-24 @ 14:00) (57 - 74)  BP: 115/57 (02-29-24 @ 14:00) (104/59 - 155/79)  RR: 16 (02-29-24 @ 14:00) (16 - 18)  SpO2: 95% (02-29-24 @ 14:00) (95% - 99%)      Exam:  Alert and [place], No Acute Distress  Card: +S1/S2, RRR  Pulm: CTAB  Laterality: R Wrist  Bulky dressing c/d/i  Comparments soft and compressible  Unable to assess motor/sensory 2/2 supraclavicular block in effect  Normal blanching and capillary refill < 2 seconds    Xray: Intra-op Fluoroscopy: R Distal Radius Fx ORIF, hardware in place and intact with no signs of new Fx.                          12.8   8.08  )-----------( 200      ( 29 Feb 2024 14:14 )             38.6    02-29    144  |  106  |  9   ----------------------------<  124<H>  3.2<L>   |  27  |  0.50    Ca    8.7      29 Feb 2024 03:38    TPro  7.3  /  Alb  4.4  /  TBili  0.8  /  DBili  x   /  AST  32  /  ALT  26  /  AlkPhos  110  02-28      A/P: 58y Female S/p R Distal Radius Fx ORIF. VSS. NAD  -PT/OT-NWB RUE in sling  -IS  -DVT PPx: Early OOB and ambulation  -Pain Control  -Continue Current Tx  -Dispo planning to home once cleared by PT/OT.  Patient eager to go home today.    Juan Messina PA-C  Orthopedic Surgery Team  Team Pager #[phone]

## 2024-02-29 NOTE — PATIENT PROFILE ADULT - FALL HARM RISK - HARM RISK INTERVENTIONS
Assistance with ambulation/Assistance OOB with selected safe patient handling equipment/Communicate Risk of Fall with Harm to all staff/Discuss with provider need for PT consult/Monitor gait and stability/Reinforce activity limits and safety measures with patient and family/Tailored Fall Risk Interventions/Visual Cue: Yellow wristband and red socks/Bed in lowest position, wheels locked, appropriate side rails in place/Call bell, personal items and telephone in reach/Instruct patient to call for assistance before getting out of bed or chair/Non-slip footwear when patient is out of bed/[place] to call system/Physically safe environment - no spills, clutter or unnecessary equipment/Purposeful Proactive Rounding/Room/bathroom lighting operational, light cord in reach

## 2024-02-29 NOTE — DISCHARGE NOTE NURSING/CASE MANAGEMENT/SOCIAL WORK - PATIENT PORTAL LINK FT
You can access the FollowMyHealth Patient Portal offered by [facility] by registering at the following website: [email or web address]. By joining [facility]’s FollowMyHealth portal, you will also be able to view your health information using other applications (apps) compatible with our system.

## 2024-02-29 NOTE — PROGRESS NOTE ADULT - SUBJECTIVE AND OBJECTIVE BOX
ORTHO ATTENDING POST OP    s/p ORIF R distal radius  Bulky dressing  elevation  keep dressing clean and dry until office visit  sling  NWB RUE  ROM as tolerated RUE  oxy to home pharmacy  pt can be dc home today once clear from PACU  f/u next week  encourage ROM fingers  if pt stays overnight: ancef 1 g x 24h

## 2024-02-29 NOTE — DISCHARGE NOTE PROVIDER - CARE PROVIDER_API CALL
Rivas Gong  Orthopaedic Surgery  [address]  [place] [address]  Phone: [phone]  Fax: [phone]  Follow Up Time:

## 2024-03-01 VITALS
HEART RATE: 78 BPM | OXYGEN SATURATION: 97 % | SYSTOLIC BLOOD PRESSURE: 159 MMHG | RESPIRATION RATE: 18 BRPM | TEMPERATURE: 98 F | DIASTOLIC BLOOD PRESSURE: 76 MMHG

## 2024-03-04 ENCOUNTER — NON-APPOINTMENT (OUTPATIENT)
Age: 59
End: 2024-03-04

## 2024-03-04 PROBLEM — Z00.00 ENCOUNTER FOR PREVENTIVE HEALTH EXAMINATION: Status: ACTIVE | Noted: 2024-03-04

## 2024-03-18 ENCOUNTER — APPOINTMENT (OUTPATIENT)
Dept: ORTHOPEDIC SURGERY | Facility: CLINIC | Age: 59
End: 2024-03-18
Payer: MEDICAID

## 2024-03-18 PROBLEM — I10 ESSENTIAL (PRIMARY) HYPERTENSION: Chronic | Status: ACTIVE | Noted: 2024-02-28

## 2024-03-18 PROCEDURE — 73110 X-RAY EXAM OF WRIST: CPT | Mod: RT

## 2024-03-18 PROCEDURE — 99024 POSTOP FOLLOW-UP VISIT: CPT

## 2024-04-08 ENCOUNTER — APPOINTMENT (OUTPATIENT)
Dept: ORTHOPEDIC SURGERY | Facility: CLINIC | Age: 59
End: 2024-04-08
Payer: MEDICAID

## 2024-04-08 DIAGNOSIS — M25.511 PAIN IN RIGHT SHOULDER: ICD-10-CM

## 2024-04-08 PROCEDURE — 73110 X-RAY EXAM OF WRIST: CPT | Mod: RT

## 2024-04-08 PROCEDURE — 99024 POSTOP FOLLOW-UP VISIT: CPT

## 2024-04-10 NOTE — HISTORY OF PRESENT ILLNESS
[de-identified] : Status post ORIF right intra-articular distal radius fracture 2/29/2024. [de-identified] : This is a 58-year-old Samoan-speaking female that presents today with her daughter who translated Samoan to English for us today. She presents for second postoperative follow-up status post ORIF of a right intra-articular four-part distal radius fracture on 2/29/2024.  Continues to be apprehensive about moving fingers and wrist after the surgery and as a result has significant stiffness of the wrist and digits, so much so that her therapist called to have her seen due to the prolonged stiffness.  She is otherwise doing well. [de-identified] : Physical exam of the right wrist. Surgical incision is fully healed there is no erythema drainage or induration.  There continues to be significant swelling of the fingers, thumb and dorsum of the hand with resolving swelling and ecchymosis. THe swelling is improved compared to prior exam.  Sensation intact to light touch throughout the right upper extremity AX, M, U, R distributions.  She is able to flex and extend her DIP IP and PIP joints however she is not yet able to make a fist.  She has normal neurovascular exam 2+ distal pulses. [de-identified] : 3 views of the right wrist taken today and reviewed by me show healing right intra-articular distal radius fracture with all hardware in good position no signs of mechanical failure. [de-identified] : Status post ORIF right four-part intra-articular distal radius fracture 2/29/2024-doing well [de-identified] : A long discussion was had with the patient and her daughter that was present at the patient's request.  We discussed that is okay to continue range of motion as tolerated. WBAT. Meloxicam prescribed to help w tendon gliding.  We discussed in detail need for home exercise program stretching and strengthening of the fingers thumb and wrist.  Home exercises were demonstrated in the office for the patient who was able to comprehend and mimic the exercises.  We will also continue physical/hand/occupational therapy to increase the range of motion and strength in the right upper extremity.  We will see her back in 3 weeks for routine follow-up and x-rays at that time all questions were answered.

## 2024-05-06 ENCOUNTER — APPOINTMENT (OUTPATIENT)
Dept: ORTHOPEDIC SURGERY | Facility: CLINIC | Age: 59
End: 2024-05-06
Payer: MEDICAID

## 2024-05-06 VITALS — BODY MASS INDEX: 30.32 KG/M2 | HEIGHT: 65 IN | WEIGHT: 182 LBS

## 2024-05-06 PROCEDURE — 99213 OFFICE O/P EST LOW 20 MIN: CPT | Mod: 25

## 2024-05-06 PROCEDURE — 73110 X-RAY EXAM OF WRIST: CPT | Mod: RT

## 2024-05-15 ENCOUNTER — RX RENEWAL (OUTPATIENT)
Age: 59
End: 2024-05-15

## 2024-05-15 RX ORDER — MELOXICAM 15 MG/1
15 TABLET ORAL DAILY
Qty: 21 | Refills: 1 | Status: ACTIVE | COMMUNITY
Start: 2024-04-04 | End: 1900-01-01

## 2024-06-03 ENCOUNTER — APPOINTMENT (OUTPATIENT)
Dept: ORTHOPEDIC SURGERY | Facility: CLINIC | Age: 59
End: 2024-06-03

## 2024-06-10 ENCOUNTER — APPOINTMENT (OUTPATIENT)
Dept: ORTHOPEDIC SURGERY | Facility: CLINIC | Age: 59
End: 2024-06-10
Payer: MEDICAID

## 2024-06-10 DIAGNOSIS — S52.571D OTHER INTRAARTICULAR FRACTURE OF LOWER END OF RIGHT RADIUS, SUBSEQUENT ENCOUNTER FOR CLOSED FRACTURE WITH ROUTINE HEALING: ICD-10-CM

## 2024-06-10 PROCEDURE — 73110 X-RAY EXAM OF WRIST: CPT | Mod: RT

## 2024-06-10 PROCEDURE — 99213 OFFICE O/P EST LOW 20 MIN: CPT | Mod: 25

## 2024-06-14 PROBLEM — S52.571D OTHER CLOSED INTRA-ARTICULAR FRACTURE OF DISTAL END OF RIGHT RADIUS WITH ROUTINE HEALING, SUBSEQUENT ENCOUNTER: Status: ACTIVE | Noted: 2024-03-16

## 2024-06-14 NOTE — HISTORY OF PRESENT ILLNESS
[de-identified] : Status post ORIF right intra-articular distal radius fracture 2/29/2024. [de-identified] : This is a 58-year-old [place]yan-speaking female that presents today with her daughter who translated Kenyan to English for us today. She presents for postoperative follow-up status post ORIF of a right intra-articular four-part distal radius fracture on 2/29/2024.  She has been participating in physical therapy  has been progressing with lifting and carrying but nots continued stiffness and fatigue towards the end of the day.  [de-identified] : Physical exam of the right wrist. Surgical incision is fully healed there is no erythema drainage or induration.  There is no swelling or ecchymosis.  Active range of motion of the right wrist is 50 degrees of wrist extension and 70 degrees of wrist flexion 90 degrees of pronation and supination she has full range of motion of her DIP, IP and PIP joints.  She is able to  and make a fist.  Normal neurovascular exam 2+ distal pulses [de-identified] : 3 views of the right wrist taken today and reviewed by me show healed right intra-articular distal radius fracture with all hardware in good position no signs of mechanical failure. [de-identified] : Status post ORIF right four-part intra-articular distal radius fracture 2/29/2024-doing well [de-identified] : She is doing well postoperatively.  She should continue with physical therapy for range of motion and strengthening as she has not yet met maximal improvement. We discussed in detail that the fatigue and soreness is likely due to lack of endurance in the muscles and should continue to get better as she strengthens.  We will see her back in 3 months for routine followup all questions were answered

## 2024-08-12 ENCOUNTER — APPOINTMENT (OUTPATIENT)
Dept: ORTHOPEDIC SURGERY | Facility: CLINIC | Age: 59
End: 2024-08-12
Payer: MEDICAID

## 2024-08-12 VITALS — BODY MASS INDEX: 30.32 KG/M2 | WEIGHT: 182 LBS | HEIGHT: 65 IN

## 2024-08-12 DIAGNOSIS — S52.571D OTHER INTRAARTICULAR FRACTURE OF LOWER END OF RIGHT RADIUS, SUBSEQUENT ENCOUNTER FOR CLOSED FRACTURE WITH ROUTINE HEALING: ICD-10-CM

## 2024-08-12 PROCEDURE — 99213 OFFICE O/P EST LOW 20 MIN: CPT | Mod: 25

## 2024-08-12 PROCEDURE — 73110 X-RAY EXAM OF WRIST: CPT | Mod: RT
